# Patient Record
Sex: FEMALE | Race: WHITE | Employment: FULL TIME | URBAN - METROPOLITAN AREA
[De-identification: names, ages, dates, MRNs, and addresses within clinical notes are randomized per-mention and may not be internally consistent; named-entity substitution may affect disease eponyms.]

---

## 2018-07-17 ENCOUNTER — OFFICE VISIT (OUTPATIENT)
Dept: FAMILY MEDICINE CLINIC | Age: 28
End: 2018-07-17
Payer: COMMERCIAL

## 2018-07-17 VITALS
DIASTOLIC BLOOD PRESSURE: 72 MMHG | TEMPERATURE: 98.1 F | RESPIRATION RATE: 20 BRPM | OXYGEN SATURATION: 98 % | WEIGHT: 237 LBS | SYSTOLIC BLOOD PRESSURE: 124 MMHG | BODY MASS INDEX: 38.09 KG/M2 | HEIGHT: 66 IN | HEART RATE: 99 BPM

## 2018-07-17 DIAGNOSIS — Z00.00 ANNUAL PHYSICAL EXAM: Primary | ICD-10-CM

## 2018-07-17 DIAGNOSIS — Z13.220 SCREENING, LIPID: ICD-10-CM

## 2018-07-17 DIAGNOSIS — Z11.4 ENCOUNTER FOR SCREENING FOR HIV: ICD-10-CM

## 2018-07-17 DIAGNOSIS — Z23 NEED FOR DIPHTHERIA-TETANUS-PERTUSSIS (TDAP) VACCINE, ADULT/ADOLESCENT: ICD-10-CM

## 2018-07-17 DIAGNOSIS — E66.09 CLASS 2 OBESITY DUE TO EXCESS CALORIES WITHOUT SERIOUS COMORBIDITY WITH BODY MASS INDEX (BMI) OF 38.0 TO 38.9 IN ADULT: ICD-10-CM

## 2018-07-17 DIAGNOSIS — F10.11 H/O ALCOHOL ABUSE: ICD-10-CM

## 2018-07-17 DIAGNOSIS — Z72.0 TOBACCO ABUSE: ICD-10-CM

## 2018-07-17 PROCEDURE — 90715 TDAP VACCINE 7 YRS/> IM: CPT | Performed by: FAMILY MEDICINE

## 2018-07-17 PROCEDURE — 90471 IMMUNIZATION ADMIN: CPT | Performed by: FAMILY MEDICINE

## 2018-07-17 PROCEDURE — 4004F PT TOBACCO SCREEN RCVD TLK: CPT | Performed by: FAMILY MEDICINE

## 2018-07-17 PROCEDURE — 99204 OFFICE O/P NEW MOD 45 MIN: CPT | Performed by: FAMILY MEDICINE

## 2018-07-17 PROCEDURE — G8427 DOCREV CUR MEDS BY ELIG CLIN: HCPCS | Performed by: FAMILY MEDICINE

## 2018-07-17 PROCEDURE — G8417 CALC BMI ABV UP PARAM F/U: HCPCS | Performed by: FAMILY MEDICINE

## 2018-07-17 RX ORDER — VARENICLINE TARTRATE 25 MG
KIT ORAL
Qty: 1 EACH | Refills: 0 | Status: SHIPPED | OUTPATIENT
Start: 2018-07-17 | End: 2018-09-11

## 2018-07-17 RX ORDER — VARENICLINE TARTRATE 1 MG/1
1 TABLET, FILM COATED ORAL 2 TIMES DAILY
Qty: 60 TABLET | Refills: 3 | Status: SHIPPED | OUTPATIENT
Start: 2018-07-17 | End: 2018-09-11

## 2018-07-17 ASSESSMENT — PATIENT HEALTH QUESTIONNAIRE - PHQ9
2. FEELING DOWN, DEPRESSED OR HOPELESS: 0
SUM OF ALL RESPONSES TO PHQ QUESTIONS 1-9: 0
1. LITTLE INTEREST OR PLEASURE IN DOING THINGS: 0
SUM OF ALL RESPONSES TO PHQ9 QUESTIONS 1 & 2: 0

## 2018-07-17 NOTE — PROGRESS NOTES
Chief Complaint   Patient presents with    [facility]     HPI: Eda Oscar is a 33 y.o. female presenting to establish care. She is healthy and does need to establish with a new PCP. Her mother and sister have hypothyroidism. She admits to being alcoholic but quit drinking 5 months ago. Her normal weight is 170#. She does have a new GYN and will see Dr. Beronica Gray next month. History reviewed. No pertinent past medical history. Past Surgical History:   Procedure Laterality Date    ROTATOR CUFF REPAIR      WISDOM TOOTH EXTRACTION       family history includes Thyroid Disease in her mother and sister. Social History     Social History    Marital status: Single     Spouse name: N/A    Number of children: N/A    Years of education: N/A     Occupational History    Not on file. Social History Main Topics    Smoking status: Current Every Day Smoker    Smokeless tobacco: Never Used    Alcohol use No    Drug use: No    Sexual activity: Yes     Other Topics Concern    Not on file     Social History Narrative    No narrative on file       No Known Allergies    Review of Systems - General ROS: positive for  - fatigue  Psychological ROS: negative  ENT ROS: positive for - nasal congestion, sinus pain and scabs in nose.   Hematological and Lymphatic ROS: negative  Respiratory ROS: positive for - cough and shortness of breath  Cardiovascular ROS: no chest pain or dyspnea on exertion  Gastrointestinal ROS: no abdominal pain, change in bowel habits, or black or bloody stools  Genito-Urinary ROS: no dysuria, trouble voiding, or hematuria  Musculoskeletal ROS: negative  Neurological ROS: no TIA or stroke symptoms  Dermatological ROS: negative    Vitals:    07/17/18 0949 07/17/18 1018   BP: 130/70 124/72   Pulse: 99    Resp: 20    Temp: 98.1 °F (36.7 °C)    TempSrc: Temporal    SpO2: 98%    Weight: 237 lb (107.5 kg)    Height: 5' 6\" (1.676 m)      Physical Examination: General appearance - alert, well appearing, and in no distress and obese. Mental status - alert, oriented to person, place, and time, normal mood, behavior, speech, dress, motor activity, and thought processes, affect appropriate to mood  Ears - bilateral TM's and external ear canals normal  Nose - normal and patent, no erythema, discharge or polyps  Mouth - mucous membranes moist, pharynx normal without lesions  Neck - supple, no significant adenopathy, carotids upstroke normal bilaterally, no bruits, thyroid exam: thyroid is normal in size without nodules or tenderness  Chest - clear to auscultation, no wheezes, rales or rhonchi, symmetric air entry  Heart - normal rate, regular rhythm, normal S1, S2, no murmurs, rubs, clicks or gallops  Abdomen - soft, nontender, nondistended, no masses or organomegaly  bowel sounds normal  Neurological - alert, oriented, normal speech, no focal findings or movement disorder noted, cranial nerves II through XII intact, DTR's normal and symmetric, normal muscle tone, no tremors, strength 5/5  Extremities - peripheral pulses normal, no pedal edema, no clubbing or cyanosis  Skin - normal coloration and turgor, no rashes, no suspicious skin lesions noted     Diagnosis Orders   1. Annual physical exam  CBC Auto Differential    Comprehensive Metabolic Panel   2. Class 2 obesity due to excess calories without serious comorbidity with body mass index (BMI) of 38.0 to 38.9 in adult  TSH with Reflex    T4, Free   3. Tobacco abuse     4. H/O alcohol abuse     5. Need for diphtheria-tetanus-pertussis (Tdap) vaccine, adult/adolescent     6. Encounter for screening for HIV  HIV-1,-2 w/Reflex to HIV-1 Western Blot   7. Screening, lipid  Lipid Panel     I have reviewed the following diagnostic data: NA.  Please see report for additional information.     Orders Placed This Encounter   Procedures    Tdap (age 10y-63y) IM (Adacel)    CBC Auto Differential     Standing Status:   Future     Standing Expiration Date:   7/17/2019

## 2018-09-11 ENCOUNTER — OFFICE VISIT (OUTPATIENT)
Dept: OBGYN CLINIC | Age: 28
End: 2018-09-11
Payer: COMMERCIAL

## 2018-09-11 VITALS — HEIGHT: 66 IN | BODY MASS INDEX: 38.31 KG/M2 | WEIGHT: 238.4 LBS

## 2018-09-11 DIAGNOSIS — N93.8 DUB (DYSFUNCTIONAL UTERINE BLEEDING): ICD-10-CM

## 2018-09-11 DIAGNOSIS — Z01.419 WELL WOMAN EXAM WITH ROUTINE GYNECOLOGICAL EXAM: Primary | ICD-10-CM

## 2018-09-11 DIAGNOSIS — Z11.3 ROUTINE SCREENING FOR STI (SEXUALLY TRANSMITTED INFECTION): ICD-10-CM

## 2018-09-11 DIAGNOSIS — N94.9 GENITAL LESION, FEMALE: ICD-10-CM

## 2018-09-11 DIAGNOSIS — Z11.51 SCREENING FOR HPV (HUMAN PAPILLOMAVIRUS): ICD-10-CM

## 2018-09-11 PROCEDURE — 4004F PT TOBACCO SCREEN RCVD TLK: CPT | Performed by: OBSTETRICS & GYNECOLOGY

## 2018-09-11 PROCEDURE — G8417 CALC BMI ABV UP PARAM F/U: HCPCS | Performed by: OBSTETRICS & GYNECOLOGY

## 2018-09-11 PROCEDURE — G8427 DOCREV CUR MEDS BY ELIG CLIN: HCPCS | Performed by: OBSTETRICS & GYNECOLOGY

## 2018-09-11 PROCEDURE — 99385 PREV VISIT NEW AGE 18-39: CPT | Performed by: OBSTETRICS & GYNECOLOGY

## 2018-09-11 PROCEDURE — 99203 OFFICE O/P NEW LOW 30 MIN: CPT | Performed by: OBSTETRICS & GYNECOLOGY

## 2018-09-11 RX ORDER — PNV NO.95/FERROUS FUM/FOLIC AC 28MG-0.8MG
1 TABLET ORAL DAILY
Qty: 30 TABLET | Refills: 11 | Status: SHIPPED | OUTPATIENT
Start: 2018-09-11

## 2018-09-11 ASSESSMENT — ENCOUNTER SYMPTOMS
VOMITING: 0
COLOR CHANGE: 0
SINUS PRESSURE: 0
NAUSEA: 0
CONSTIPATION: 0
ABDOMINAL PAIN: 0
BLOOD IN STOOL: 0
COUGH: 0
SHORTNESS OF BREATH: 0
WHEEZING: 0

## 2018-09-11 ASSESSMENT — PATIENT HEALTH QUESTIONNAIRE - PHQ9
SUM OF ALL RESPONSES TO PHQ QUESTIONS 1-9: 0
SUM OF ALL RESPONSES TO PHQ9 QUESTIONS 1 & 2: 0
2. FEELING DOWN, DEPRESSED OR HOPELESS: 0
SUM OF ALL RESPONSES TO PHQ QUESTIONS 1-9: 0
1. LITTLE INTEREST OR PLEASURE IN DOING THINGS: 0

## 2018-09-11 NOTE — PROGRESS NOTES
lb 9 oz (3.43 kg) Molly Sovereign FD   1 Term 05/22/09 39w0d  9 lb 11 oz (4.394 kg) F Vag-Spont EPI N NICOLE        History reviewed. No pertinent past medical history. Past Surgical History:   Procedure Laterality Date    ARM SURGERY Left 2017    \"piece of plastic removed\"     ROTATOR CUFF REPAIR Right 2007    WISDOM TOOTH EXTRACTION       Family History   Problem Relation Age of Onset    Thyroid Disease Mother     Other Mother         endometriosis     Thyroid Disease Sister     Hypertension Maternal Grandmother     Heart Disease Maternal Grandfather     Heart Attack Maternal Grandfather     COPD Paternal Grandfather     Emphysema Paternal Grandfather     Heart Disease Paternal Grandfather      Social History     Social History    Marital status: Single     Spouse name: N/A    Number of children: N/A    Years of education: N/A     Occupational History    Not on file. Social History Main Topics    Smoking status: Current Every Day Smoker    Smokeless tobacco: Never Used    Alcohol use No    Drug use: No    Sexual activity: Yes     Partners: Male     Other Topics Concern    Not on file     Social History Narrative    No narrative on file         MEDICATIONS:  Current Outpatient Prescriptions on File Prior to Visit   Medication Sig Dispense Refill    Multiple Vitamin (MULTI VITAMIN DAILY PO) Take by mouth      Acetaminophen (TYLENOL) 325 MG CAPS Take by mouth       No current facility-administered medications on file prior to visit. ALLERGIES:  Allergies as of 09/11/2018    (No Known Allergies)           Gynecologic History:     Patient's last menstrual period was 09/05/2018.      ________________________________________________________________________  REVIEW OF SYSTEMS:       Review of Systems   Constitutional: Negative for chills, fatigue, fever and unexpected weight change.    HENT: Negative for hearing loss, sinus pressure and tinnitus. Eyes: Negative for visual disturbance. Respiratory: Negative for cough, shortness of breath and wheezing. Cardiovascular: Negative for chest pain, palpitations and leg swelling. Gastrointestinal: Negative for abdominal pain, blood in stool, constipation, nausea and vomiting. Genitourinary: Negative for difficulty urinating, dysuria, flank pain, hematuria, pelvic pain and vaginal discharge. Musculoskeletal: Negative for arthralgias and neck stiffness. Skin: Negative for color change, pallor and rash. Allergic/Immunologic: Negative for food allergies. Neurological: Negative for dizziness, speech difficulty, weakness and numbness. Psychiatric/Behavioral: Negative for behavioral problems, self-injury and suicidal ideas. The patient is not nervous/anxious. PHYSICAL Exam:    Constitutional:  Vitals:    09/11/18 1338   Weight: 238 lb 6.4 oz (108.1 kg)   Height: 5' 6\" (1.676 m)       Physical Exam   Constitutional: She is oriented to person, place, and time. She appears well-developed and well-nourished. HENT:   Left Ear: External ear normal.   Nose: Nose normal.   Eyes: Conjunctivae and EOM are normal.   Neck: Normal range of motion. Cardiovascular: Normal rate and regular rhythm. Pulmonary/Chest: Effort normal and breath sounds normal. Right breast exhibits no inverted nipple, no mass, no nipple discharge, no skin change and no tenderness. Left breast exhibits no inverted nipple, no mass, no nipple discharge, no skin change and no tenderness. Breasts are symmetrical.   Abdominal: Soft. Bowel sounds are normal.   Genitourinary: Vagina normal and uterus normal. Rectal exam shows no external hemorrhoid and guaiac negative stool. There is no rash, lesion or injury on the right labia.  There is no rash, lesion or injury on the left labia. Cervix exhibits no discharge. No erythema or tenderness in the vagina. No foreign body in the vagina. No signs of injury around the vagina. No vaginal discharge found. Musculoskeletal: Normal range of motion. Neurological: She is alert and oriented to person, place, and time. She has normal reflexes. Skin: Skin is warm. No lesion and no rash noted. No erythema. Psychiatric: She has a normal mood and affect. Her speech is normal and behavior is normal. Judgment and thought content normal. Her mood appears not anxious. She expresses no homicidal and no suicidal ideation. ASSESSMENT:      33 y.o. Annual   Diagnosis Orders   1. Well woman exam with routine gynecological exam  PAP SMEAR   2. Screening for HPV (human papillomavirus)  PAP SMEAR   3. Routine screening for STI (sexually transmitted infection)  Wet Prep, Genital    C.trachomatis N.gonorrhoeae DNA   4. DUB (dysfunctional uterine bleeding)  Follicle Stimulating Hormone    HCG, Quantitative, Pregnancy    Luteinizing Hormone    CBC Auto Differential    Prolactin    T4, Free    TSH with Reflex    US PELVIS COMPLETE    US NON OB TRANSVAGINAL                  Hereditary Breast, Ovarian, Colon and Uterine Cancer screening Done. Tobacco & Secondary smoke risks reviewed; instructed on cessation and avoidance      Counseling Completed:          PLAN:    Return in about 1 year (around 9/11/2019) for annual.  Repeat Annual every 1 year  Cervical Cytology Evaluation begins at 24years old. If Negative Cytology, Follow-up screening per current guidelines. Mammograms every 1 year. If 37 yo and last mammogram was negative. Calcium and Vitamin D dosing reviewed. Colonoscopy screening reviewed as well as onset for bone density testing. Birth control and barrier recommendations discussed. STD counseling and prevention reviewed. Gardisil counseling completed for all patients 7-35 yo. Routine health maintenance per patients PCP.     Orders placed in this encounter. I, Joe Live, am scribing for, and in the presence of, Dr Gordon Mendez. Electronically signed by: Joe Live 9/11/18 2:25 PM    I, Dr Gordon Mendez, personally performed the services described in this documentation, as scribed by Joe Live in my presence, and it is both accurate and complete.  Electronically signed by: Gordon Mendez MD 9/12/18 11:08 AM

## 2018-09-19 ENCOUNTER — HOSPITAL ENCOUNTER (OUTPATIENT)
Dept: ULTRASOUND IMAGING | Age: 28
Discharge: HOME | End: 2018-09-21
Payer: COMMERCIAL

## 2018-09-19 DIAGNOSIS — N93.8 DUB (DYSFUNCTIONAL UTERINE BLEEDING): ICD-10-CM

## 2018-09-19 PROCEDURE — 76830 TRANSVAGINAL US NON-OB: CPT

## 2018-09-19 PROCEDURE — 76856 US EXAM PELVIC COMPLETE: CPT

## 2018-10-08 DIAGNOSIS — Z32.01 POSITIVE PREGNANCY TEST: Primary | ICD-10-CM

## 2018-10-16 ENCOUNTER — TELEPHONE (OUTPATIENT)
Dept: OBGYN CLINIC | Age: 28
End: 2018-10-16

## 2018-11-08 ENCOUNTER — OFFICE VISIT (OUTPATIENT)
Dept: FAMILY MEDICINE CLINIC | Age: 28
End: 2018-11-08
Payer: COMMERCIAL

## 2018-11-08 VITALS
TEMPERATURE: 97.3 F | DIASTOLIC BLOOD PRESSURE: 74 MMHG | HEART RATE: 90 BPM | HEIGHT: 66 IN | BODY MASS INDEX: 38.57 KG/M2 | SYSTOLIC BLOOD PRESSURE: 124 MMHG | OXYGEN SATURATION: 97 % | WEIGHT: 240 LBS | RESPIRATION RATE: 16 BRPM

## 2018-11-08 DIAGNOSIS — J06.9 URTI (ACUTE UPPER RESPIRATORY INFECTION): ICD-10-CM

## 2018-11-08 DIAGNOSIS — J20.9 ACUTE BRONCHITIS, UNSPECIFIED ORGANISM: Primary | ICD-10-CM

## 2018-11-08 PROCEDURE — 4004F PT TOBACCO SCREEN RCVD TLK: CPT | Performed by: INTERNAL MEDICINE

## 2018-11-08 PROCEDURE — G8417 CALC BMI ABV UP PARAM F/U: HCPCS | Performed by: INTERNAL MEDICINE

## 2018-11-08 PROCEDURE — 99213 OFFICE O/P EST LOW 20 MIN: CPT | Performed by: INTERNAL MEDICINE

## 2018-11-08 PROCEDURE — G8484 FLU IMMUNIZE NO ADMIN: HCPCS | Performed by: INTERNAL MEDICINE

## 2018-11-08 PROCEDURE — G8427 DOCREV CUR MEDS BY ELIG CLIN: HCPCS | Performed by: INTERNAL MEDICINE

## 2018-11-08 RX ORDER — METOCLOPRAMIDE 10 MG/1
TABLET ORAL
Refills: 0 | COMMUNITY
Start: 2018-10-27

## 2018-11-08 NOTE — PROGRESS NOTES
27-0.8 MG TABS Take 1 tablet by mouth daily 30 tablet 11    Multiple Vitamin (MULTI VITAMIN DAILY PO) Take by mouth      Acetaminophen (TYLENOL) 325 MG CAPS Take by mouth       No current facility-administered medications on file prior to visit. Review of Systems   Constitutional: Negative for chills, diaphoresis, fatigue and fever. HENT: Positive for congestion and sinus pressure. Negative for drooling, ear discharge, ear pain, rhinorrhea, sinus pain, sneezing and sore throat. Respiratory: Positive for cough and chest tightness. Negative for shortness of breath and wheezing. Cardiovascular: Positive for chest pain. Gastrointestinal: Negative for abdominal pain, diarrhea, nausea and vomiting. Endocrine: Negative for cold intolerance and heat intolerance. Genitourinary: Negative for dysuria and frequency. Neurological: Negative for dizziness and light-headedness. Objective:   /74   Pulse 90   Temp 97.3 °F (36.3 °C) (Temporal)   Resp 16   Ht 5' 6\" (1.676 m)   Wt 240 lb (108.9 kg)   LMP 09/05/2018   SpO2 97%   BMI 38.74 kg/m²     Physical Exam   Constitutional: She is oriented to person, place, and time. She appears well-developed and well-nourished. No distress. HENT:   Head: Normocephalic and atraumatic. Right Ear: External ear normal.   Left Ear: External ear normal.   Mouth/Throat: Oropharynx is clear and moist. No oropharyngeal exudate. No TM or pharyngeal erythema  No sinus TTP     Cardiovascular: Normal rate, regular rhythm, S1 normal, S2 normal and normal heart sounds. Pulmonary/Chest: Effort normal and breath sounds normal. No accessory muscle usage. No tachypnea. No respiratory distress. She has no wheezes. She has no rales. Abdominal: Soft. Bowel sounds are normal. There is no tenderness. Musculoskeletal: She exhibits no edema. Neurological: She is alert and oriented to person, place, and time. Skin: She is not diaphoretic.      Assessment: Diagnosis Orders   1. Acute bronchitis, unspecified organism     2. URTI (acute upper respiratory infection)       Plan:      No orders of the defined types were placed in this encounter. No orders of the defined types were placed in this encounter. Pt advised on OTC tylenol, fluids and rest.    Return in about 2 weeks (around 11/22/2018) for assessment of response to treatment, review of test results.

## 2018-11-09 ASSESSMENT — ENCOUNTER SYMPTOMS
SORE THROAT: 0
ABDOMINAL PAIN: 0
CHEST TIGHTNESS: 1
SINUS PAIN: 0
DIARRHEA: 0
WHEEZING: 0
COUGH: 1
SHORTNESS OF BREATH: 0
NAUSEA: 0
VOMITING: 0
SINUS PRESSURE: 1
RHINORRHEA: 0

## 2019-06-07 LAB
ABO: NORMAL
ALBUMIN: 3.3 G/DL (ref 3.4–5)
ALP BLD-CCNC: 130 U/L (ref 33–110)
ALT SERPL-CCNC: 12 U/L (ref 7–45)
ANION GAP SERPL CALCULATED.3IONS-SCNC: 11 MMOL/L (ref 10–20)
ANTIBODY SCREEN: NORMAL
AST SERPL-CCNC: 15 U/L (ref 9–39)
BICARBONATE: 23 MMOL/L (ref 21–32)
BILIRUB SERPL-MCNC: 0.3 MG/DL (ref 0–1.2)
CALCIUM SERPL-MCNC: 8.6 MG/DL (ref 8.6–10.3)
CHLORIDE BLD-SCNC: 105 MMOL/L (ref 98–107)
CREAT SERPL-MCNC: 0.49 MG/DL (ref 0.5–1)
ERYTHROCYTE [DISTWIDTH] IN BLOOD BY AUTOMATED COUNT: 13.5 % (ref 11.5–14)
GFR AFRICAN AMERICAN: >60 ML/MIN/1.73M2
GFR NON-AFRICAN AMERICAN: >60 ML/MIN/1.73M2
GLUCOSE: 91 MG/DL (ref 74–99)
HCT VFR BLD CALC: 35.9 % (ref 36–46)
HEMOGLOBIN: 11.4 G/DL (ref 12–16)
MCHC RBC AUTO-ENTMCNC: 31.8 G/DL (ref 32–36)
MCV RBC AUTO: 95 FL (ref 80–100)
PLATELET # BLD: 301 X10E9/L (ref 150–450)
POTASSIUM SERPL-SCNC: 3.6 MMOL/L (ref 3.5–5.3)
RBC # BLD: 3.78 X10E12/L (ref 4–5.2)
RH TYPE: NORMAL
SODIUM BLD-SCNC: 135 MMOL/L (ref 136–145)
TOTAL PROTEIN: 5.9 G/DL (ref 6.4–8.2)
UREA NITROGEN: 5 MG/DL (ref 6–23)
WBC: 16.9 X10E9/L (ref 4.4–11.3)